# Patient Record
Sex: FEMALE | Race: WHITE | NOT HISPANIC OR LATINO | Employment: STUDENT | URBAN - METROPOLITAN AREA
[De-identification: names, ages, dates, MRNs, and addresses within clinical notes are randomized per-mention and may not be internally consistent; named-entity substitution may affect disease eponyms.]

---

## 2023-03-20 ENCOUNTER — TELEPHONE (OUTPATIENT)
Dept: PEDIATRICS | Facility: CLINIC | Age: 18
End: 2023-03-20
Payer: COMMERCIAL

## 2023-03-20 DIAGNOSIS — Z00.129 WELL ADOLESCENT VISIT: Primary | ICD-10-CM

## 2023-03-20 RX ORDER — DESOGESTREL AND ETHINYL ESTRADIOL 0.15-0.03
1 KIT ORAL DAILY
Qty: 28 TABLET | Refills: 12 | Status: SHIPPED | OUTPATIENT
Start: 2023-03-20 | End: 2023-05-11 | Stop reason: SDUPTHER

## 2023-03-20 RX ORDER — DESOGESTREL AND ETHINYL ESTRADIOL 0.15-0.03
1 KIT ORAL DAILY
COMMUNITY
End: 2023-03-20 | Stop reason: SDUPTHER

## 2023-03-20 NOTE — TELEPHONE ENCOUNTER
Needs refill on birth control pill - she has another appt. With a family doctor in May, but is out of BCP.       Done

## 2023-05-11 ENCOUNTER — OFFICE VISIT (OUTPATIENT)
Dept: PRIMARY CARE | Facility: CLINIC | Age: 18
End: 2023-05-11
Payer: COMMERCIAL

## 2023-05-11 DIAGNOSIS — Z30.41 ENCOUNTER FOR SURVEILLANCE OF CONTRACEPTIVE PILLS: ICD-10-CM

## 2023-05-11 DIAGNOSIS — Z00.129 WELL ADOLESCENT VISIT: ICD-10-CM

## 2023-05-11 DIAGNOSIS — Z11.3 SCREEN FOR SEXUALLY TRANSMITTED DISEASES: ICD-10-CM

## 2023-05-11 DIAGNOSIS — Z00.00 HEALTHCARE MAINTENANCE: Primary | ICD-10-CM

## 2023-05-11 PROBLEM — D50.9 IRON DEFICIENCY ANEMIA: Status: ACTIVE | Noted: 2023-05-11

## 2023-05-11 PROCEDURE — 1036F TOBACCO NON-USER: CPT | Performed by: FAMILY MEDICINE

## 2023-05-11 PROCEDURE — 87491 CHLMYD TRACH DNA AMP PROBE: CPT

## 2023-05-11 PROCEDURE — 87591 N.GONORRHOEAE DNA AMP PROB: CPT

## 2023-05-11 PROCEDURE — 99395 PREV VISIT EST AGE 18-39: CPT | Performed by: FAMILY MEDICINE

## 2023-05-11 RX ORDER — DESOGESTREL AND ETHINYL ESTRADIOL 0.15-0.03
1 KIT ORAL DAILY
Qty: 28 TABLET | Refills: 12 | Status: SHIPPED | OUTPATIENT
Start: 2023-05-11 | End: 2024-06-03 | Stop reason: SDUPTHER

## 2023-05-11 RX ORDER — DESOGESTREL AND ETHINYL ESTRADIOL 0.15-0.03
1 KIT ORAL DAILY
Qty: 28 TABLET | Refills: 12 | Status: SHIPPED | OUTPATIENT
Start: 2023-05-11 | End: 2023-05-11 | Stop reason: SDUPTHER

## 2023-05-11 ASSESSMENT — ENCOUNTER SYMPTOMS
HEADACHES: 0
WEAKNESS: 0
ABDOMINAL PAIN: 0
FATIGUE: 0
CONSTIPATION: 0
BLOOD IN STOOL: 0
ARTHRALGIAS: 0
EYE PAIN: 0
DIARRHEA: 0
SORE THROAT: 0
NAUSEA: 0
PALPITATIONS: 0
RHINORRHEA: 0
FREQUENCY: 0
SLEEP DISTURBANCE: 0
MYALGIAS: 0
DYSURIA: 0
DYSPHORIC MOOD: 0
SHORTNESS OF BREATH: 0
NERVOUS/ANXIOUS: 0
VOMITING: 0
BACK PAIN: 0
COUGH: 0
UNEXPECTED WEIGHT CHANGE: 0
NUMBNESS: 0
DIZZINESS: 0

## 2023-05-11 ASSESSMENT — PATIENT HEALTH QUESTIONNAIRE - PHQ9
1. LITTLE INTEREST OR PLEASURE IN DOING THINGS: NOT AT ALL
2. FEELING DOWN, DEPRESSED OR HOPELESS: NOT AT ALL
SUM OF ALL RESPONSES TO PHQ9 QUESTIONS 1 AND 2: 0

## 2023-05-11 NOTE — PROGRESS NOTES
"Subjective   Patient ID: Brittany Sandoval is a 17 y.o. female who presents for Annual Exam (And NPV).    Brittany is here for a physical.    Diet:  [employer] food  Exercise:  Running, resistance training  Sleep:  Good  Stress:  High currently but normally not bad.    Dentist:  Sees regularly  Eye doctor:  Sees regularly    Last Pap:  N/A  Mammogram:  Age 38  Colorectal cancer screening:  age 43  DEXA scan:  Age 63  Vaccines due?  May need another HPV, tetanus due next year    Sexually active:  Yes  Contraception:  OCPs          Review of Systems   Constitutional:  Negative for fatigue and unexpected weight change.   HENT:  Negative for congestion, ear pain, rhinorrhea and sore throat.    Eyes:  Negative for pain and visual disturbance.   Respiratory:  Negative for cough and shortness of breath.    Cardiovascular:  Negative for chest pain and palpitations.   Gastrointestinal:  Negative for abdominal pain, blood in stool, constipation, diarrhea, nausea and vomiting.   Genitourinary:  Negative for dysuria, frequency, menstrual problem and vaginal discharge.   Musculoskeletal:  Negative for arthralgias, back pain and myalgias.   Skin:  Negative for rash.   Neurological:  Negative for dizziness, weakness, numbness and headaches.   Psychiatric/Behavioral:  Negative for dysphoric mood and sleep disturbance. The patient is not nervous/anxious.        Objective     BP (P) 126/86   Pulse (P) 94   Temp (P) 36.9 °C (98.5 °F)   Ht (P) 1.626 m (5' 4\")   Wt (P) 72.1 kg (159 lb)   SpO2 (P) 97%   BMI (P) 27.29 kg/m²     Physical Exam  Constitutional:       General: She is not in acute distress.  HENT:      Right Ear: Tympanic membrane normal.      Left Ear: Tympanic membrane normal.   Neck:      Thyroid: No thyromegaly.   Cardiovascular:      Rate and Rhythm: Normal rate and regular rhythm.      Heart sounds: No murmur heard.  Pulmonary:      Effort: No respiratory distress.      Breath sounds: Normal breath sounds. "   Chest:   Breasts:     Breasts are symmetrical.      Right: No mass, nipple discharge, skin change or tenderness.      Left: No mass, nipple discharge, skin change or tenderness.   Abdominal:      General: Bowel sounds are normal. There is no distension.      Palpations: Abdomen is soft. There is no hepatomegaly or splenomegaly.      Tenderness: There is no abdominal tenderness.   Musculoskeletal:         General: No swelling or tenderness.   Lymphadenopathy:      Cervical: No cervical adenopathy.      Upper Body:      Right upper body: No axillary adenopathy.      Left upper body: No axillary adenopathy.   Skin:     General: Skin is warm and dry.      Coloration: Skin is not jaundiced.      Findings: No rash.   Neurological:      General: No focal deficit present.      Mental Status: She is alert and oriented to person, place, and time.   Psychiatric:         Mood and Affect: Mood normal.         Behavior: Behavior normal.             Assessment/Plan   Problem List Items Addressed This Visit    None  Visit Diagnoses       Healthcare maintenance    -  Primary    Screen for sexually transmitted diseases        Relevant Orders    C. Trachomatis / N. Gonorrhoeae, Amplified Detection    Encounter for surveillance of contraceptive pills        Well adolescent visit        Relevant Medications    Isibloom 0.15-0.03 mg tablet

## 2023-05-11 NOTE — PATIENT INSTRUCTIONS
"Thank you for coming in to see me today, and congratulations on paying attention to staying healthy!    The single most important factor in staying healthy is eating a healthy diet.  Research has shown that the healthiest diet for humans is one rich in whole fresh plant foods.  This means most of what you eat should be fresh fruits and vegetables, whole grains, beans, nuts and seeds.  Adding meat, dairy foods and eggs does not make your food healthier (although it may make it taste better!) so you should work to minimize the amount of beef, chicken, pork, turkey, lamb, cheese and eggs you eat.  Fatty fish like salmon and tuna may be healthier alternatives.  If you would like more information please check out the website \"[place] [employer],\" and the books \"The Blue Zones\" by Vishal Segura and \"Engine 2 Diet\" by Aleks Matthews.    I don't like recommending \"exercise\" because that has unpleasant connotations of pain and being out of breath for many people.  Instead, I encourage my patients to find a way to move your body that you LOVE and would want to do even if it were bad for you!  Playing a fun sport like pickleball, doing yoga or reji chi, studying martial arts, learning to dance, even chasing your kids or grandkids around the backyard are great examples of activity that makes your heart healthier.  Remember, if you don't like it, don't do it!  There are plenty of fun options, pick one and try it out!  Aim for at least 30 minutes of activity that gets your heart revved up, most days per week.    We discussed some screening tests for you today, these tests are meant to find problems before they make you sick, when they are easier to treat and less likely to impact your health.  Depending on your age and stage of life they may include blood tests, a Pap test, a mammogram, a bone density test, a colonoscopy and others.  If you have questions later about these or other recommended tests please call and ask!    There are " a number of other things you can do to improve your health.  These may include things like   Increasing the amount of water you drink every day (aim for 1 oz of water for every 2 pounds of body weight, up to about 100 oz total)  Getting 7-8 oz of sleep every night  Avoiding smoking, drinking alcohol, and using recreational drugs    Stress management is also a very important component of staying healthy.  One of the most effective stress management tools is meditation.  I recommend everyone consider proper training in meditation - it isn't just sitting with your eyes closed and breathing.  You can find a training program in your area at [employer] or [email or web address].    An annual physical is a great measure to keep you as healthy as you possibly can be.  Good for you for getting that done!  See you next year :-)

## 2023-05-13 LAB
CHLAMYDIA TRACH., AMPLIFIED: NEGATIVE
N. GONORRHEA, AMPLIFIED: NEGATIVE

## 2024-05-13 ENCOUNTER — TELEPHONE (OUTPATIENT)
Dept: PRIMARY CARE | Facility: CLINIC | Age: 19
End: 2024-05-13
Payer: COMMERCIAL

## 2024-06-03 DIAGNOSIS — Z00.129 WELL ADOLESCENT VISIT: ICD-10-CM

## 2024-06-03 RX ORDER — DESOGESTREL AND ETHINYL ESTRADIOL 0.15-0.03
1 KIT ORAL DAILY
Qty: 28 TABLET | Refills: 0 | Status: SHIPPED | OUTPATIENT
Start: 2024-06-03

## 2024-06-23 ASSESSMENT — PROMIS GLOBAL HEALTH SCALE
RATE_SOCIAL_SATISFACTION: VERY GOOD
CARRYOUT_PHYSICAL_ACTIVITIES: COMPLETELY
RATE_PHYSICAL_HEALTH: GOOD
CARRYOUT_SOCIAL_ACTIVITIES: VERY GOOD
EMOTIONAL_PROBLEMS: NEVER
RATE_GENERAL_HEALTH: VERY GOOD
RATE_QUALITY_OF_LIFE: VERY GOOD
RATE_AVERAGE_PAIN: 0
RATE_MENTAL_HEALTH: VERY GOOD

## 2024-06-25 ENCOUNTER — LAB (OUTPATIENT)
Dept: LAB | Facility: LAB | Age: 19
End: 2024-06-25
Payer: COMMERCIAL

## 2024-06-25 ENCOUNTER — APPOINTMENT (OUTPATIENT)
Dept: PRIMARY CARE | Facility: CLINIC | Age: 19
End: 2024-06-25
Payer: COMMERCIAL

## 2024-06-25 VITALS
WEIGHT: 169.8 LBS | OXYGEN SATURATION: 97 % | HEIGHT: 64 IN | HEART RATE: 84 BPM | DIASTOLIC BLOOD PRESSURE: 96 MMHG | BODY MASS INDEX: 28.99 KG/M2 | TEMPERATURE: 95.6 F | SYSTOLIC BLOOD PRESSURE: 133 MMHG

## 2024-06-25 DIAGNOSIS — Z00.00 HEALTHCARE MAINTENANCE: ICD-10-CM

## 2024-06-25 DIAGNOSIS — Z12.4 SCREENING FOR CERVICAL CANCER: ICD-10-CM

## 2024-06-25 DIAGNOSIS — D50.9 IRON DEFICIENCY ANEMIA, UNSPECIFIED IRON DEFICIENCY ANEMIA TYPE: ICD-10-CM

## 2024-06-25 DIAGNOSIS — Z00.129 WELL ADOLESCENT VISIT: ICD-10-CM

## 2024-06-25 DIAGNOSIS — Z00.00 HEALTHCARE MAINTENANCE: Primary | ICD-10-CM

## 2024-06-25 DIAGNOSIS — Z11.59 NEED FOR HEPATITIS C SCREENING TEST: ICD-10-CM

## 2024-06-25 DIAGNOSIS — Z23 NEED FOR VACCINATION: ICD-10-CM

## 2024-06-25 LAB
ALBUMIN SERPL BCP-MCNC: 4.3 G/DL (ref 3.4–5)
ALP SERPL-CCNC: 72 U/L (ref 33–110)
ALT SERPL W P-5'-P-CCNC: 10 U/L (ref 7–45)
ANION GAP SERPL CALC-SCNC: 14 MMOL/L (ref 10–20)
AST SERPL W P-5'-P-CCNC: 19 U/L (ref 9–39)
BASOPHILS # BLD AUTO: 0.03 X10*3/UL (ref 0–0.1)
BASOPHILS NFR BLD AUTO: 0.5 %
BILIRUB SERPL-MCNC: 0.4 MG/DL (ref 0–1.2)
BUN SERPL-MCNC: 7 MG/DL (ref 6–23)
CALCIUM SERPL-MCNC: 9.6 MG/DL (ref 8.6–10.6)
CHLORIDE SERPL-SCNC: 103 MMOL/L (ref 98–107)
CO2 SERPL-SCNC: 26 MMOL/L (ref 21–32)
CREAT SERPL-MCNC: 0.6 MG/DL (ref 0.5–1.05)
EGFRCR SERPLBLD CKD-EPI 2021: >90 ML/MIN/1.73M*2
EOSINOPHIL # BLD AUTO: 0.1 X10*3/UL (ref 0–0.7)
EOSINOPHIL NFR BLD AUTO: 1.6 %
ERYTHROCYTE [DISTWIDTH] IN BLOOD BY AUTOMATED COUNT: 12.3 % (ref 11.5–14.5)
GLUCOSE SERPL-MCNC: 96 MG/DL (ref 74–99)
HCT VFR BLD AUTO: 39.8 % (ref 36–46)
HCV AB SER QL: NONREACTIVE
HGB BLD-MCNC: 13.3 G/DL (ref 12–16)
IMM GRANULOCYTES # BLD AUTO: 0.01 X10*3/UL (ref 0–0.7)
IMM GRANULOCYTES NFR BLD AUTO: 0.2 % (ref 0–0.9)
IRON SATN MFR SERPL: 22 % (ref 25–45)
IRON SERPL-MCNC: 97 UG/DL (ref 35–150)
LYMPHOCYTES # BLD AUTO: 2.48 X10*3/UL (ref 1.2–4.8)
LYMPHOCYTES NFR BLD AUTO: 40.1 %
MCH RBC QN AUTO: 30.3 PG (ref 26–34)
MCHC RBC AUTO-ENTMCNC: 33.4 G/DL (ref 32–36)
MCV RBC AUTO: 91 FL (ref 80–100)
MONOCYTES # BLD AUTO: 0.6 X10*3/UL (ref 0.1–1)
MONOCYTES NFR BLD AUTO: 9.7 %
NEUTROPHILS # BLD AUTO: 2.96 X10*3/UL (ref 1.2–7.7)
NEUTROPHILS NFR BLD AUTO: 47.9 %
NRBC BLD-RTO: 0 /100 WBCS (ref 0–0)
PLATELET # BLD AUTO: 258 X10*3/UL (ref 150–450)
POTASSIUM SERPL-SCNC: 4.4 MMOL/L (ref 3.5–5.3)
PROT SERPL-MCNC: 7.3 G/DL (ref 6.4–8.2)
RBC # BLD AUTO: 4.39 X10*6/UL (ref 4–5.2)
SODIUM SERPL-SCNC: 139 MMOL/L (ref 136–145)
TIBC SERPL-MCNC: 445 UG/DL (ref 240–445)
UIBC SERPL-MCNC: 348 UG/DL (ref 110–370)
WBC # BLD AUTO: 6.2 X10*3/UL (ref 4.4–11.3)

## 2024-06-25 PROCEDURE — 87491 CHLMYD TRACH DNA AMP PROBE: CPT

## 2024-06-25 PROCEDURE — 87591 N.GONORRHOEAE DNA AMP PROB: CPT

## 2024-06-25 PROCEDURE — 90715 TDAP VACCINE 7 YRS/> IM: CPT | Performed by: FAMILY MEDICINE

## 2024-06-25 PROCEDURE — 99395 PREV VISIT EST AGE 18-39: CPT | Performed by: FAMILY MEDICINE

## 2024-06-25 PROCEDURE — 1036F TOBACCO NON-USER: CPT | Performed by: FAMILY MEDICINE

## 2024-06-25 PROCEDURE — 83540 ASSAY OF IRON: CPT

## 2024-06-25 PROCEDURE — 36415 COLL VENOUS BLD VENIPUNCTURE: CPT

## 2024-06-25 PROCEDURE — 80053 COMPREHEN METABOLIC PANEL: CPT

## 2024-06-25 PROCEDURE — 85025 COMPLETE CBC W/AUTO DIFF WBC: CPT

## 2024-06-25 PROCEDURE — 90471 IMMUNIZATION ADMIN: CPT | Performed by: FAMILY MEDICINE

## 2024-06-25 PROCEDURE — 86803 HEPATITIS C AB TEST: CPT

## 2024-06-25 PROCEDURE — 83550 IRON BINDING TEST: CPT

## 2024-06-25 RX ORDER — MULTIVIT-MIN/IRON FUM/FOLIC AC 7.5 MG-4
1 TABLET ORAL DAILY
COMMUNITY

## 2024-06-25 RX ORDER — DESOGESTREL AND ETHINYL ESTRADIOL 0.15-0.03
1 KIT ORAL DAILY
Qty: 360 TABLET | Refills: 0 | Status: SHIPPED | OUTPATIENT
Start: 2024-06-25 | End: 2025-06-25

## 2024-06-25 ASSESSMENT — PATIENT HEALTH QUESTIONNAIRE - PHQ9
1. LITTLE INTEREST OR PLEASURE IN DOING THINGS: NOT AT ALL
SUM OF ALL RESPONSES TO PHQ9 QUESTIONS 1 AND 2: 0
2. FEELING DOWN, DEPRESSED OR HOPELESS: NOT AT ALL

## 2024-06-25 ASSESSMENT — ENCOUNTER SYMPTOMS
MYALGIAS: 0
DYSURIA: 0
WEAKNESS: 0
SORE THROAT: 0
BACK PAIN: 0
DIARRHEA: 0
DEPRESSION: 0
FREQUENCY: 0
NERVOUS/ANXIOUS: 0
SLEEP DISTURBANCE: 0
BLOOD IN STOOL: 0
DIZZINESS: 0
SHORTNESS OF BREATH: 0
RHINORRHEA: 0
CONSTIPATION: 0
NUMBNESS: 0
COUGH: 0
ARTHRALGIAS: 0
UNEXPECTED WEIGHT CHANGE: 1
EYE PAIN: 0
PALPITATIONS: 0
NAUSEA: 0
FATIGUE: 0
DYSPHORIC MOOD: 0
HEADACHES: 0
ABDOMINAL PAIN: 0
VOMITING: 0

## 2024-06-25 ASSESSMENT — COLUMBIA-SUICIDE SEVERITY RATING SCALE - C-SSRS
1. IN THE PAST MONTH, HAVE YOU WISHED YOU WERE DEAD OR WISHED YOU COULD GO TO SLEEP AND NOT WAKE UP?: NO
2. HAVE YOU ACTUALLY HAD ANY THOUGHTS OF KILLING YOURSELF?: NO
6. HAVE YOU EVER DONE ANYTHING, STARTED TO DO ANYTHING, OR PREPARED TO DO ANYTHING TO END YOUR LIFE?: NO

## 2024-06-25 NOTE — PROGRESS NOTES
"Subjective   Patient ID: Brittany Sandoval is a 19 y.o. female who presents for Annual Exam and Gynecologic Exam.    Brittany is here for a physical.    Diet:  [employer] food  Exercise:  Running, resistance training  Sleep:  Good  Stress:  Low  Smoking:  Never  EtOH:  Occasional    Profession:  College student, rising senior, early childhood education    Dentist:  Sees regularly  Eye doctor:  Sees regularly    Last Pap:  N/A  Mammogram:  Age 38  Colorectal cancer screening:  age 43  DEXA scan:  Age 63  Vaccines due?  Needs TDaP    Sexually active:  Yes  Contraception:  OCPs      Gynecologic Exam  The patient's pertinent negatives include no vaginal discharge. Pertinent negatives include no abdominal pain, back pain, constipation, diarrhea, dysuria, frequency, headaches, nausea, rash, sore throat or vomiting.       Review of Systems   Constitutional:  Positive for unexpected weight change (has gained 10 pounds in the last year). Negative for fatigue.   HENT:  Negative for congestion, ear pain, rhinorrhea and sore throat.    Eyes:  Negative for pain and visual disturbance.   Respiratory:  Negative for cough and shortness of breath.    Cardiovascular:  Negative for chest pain and palpitations.   Gastrointestinal:  Negative for abdominal pain, blood in stool, constipation, diarrhea, nausea and vomiting.   Genitourinary:  Negative for dysuria, frequency, menstrual problem and vaginal discharge.   Musculoskeletal:  Negative for arthralgias, back pain and myalgias.   Skin:  Negative for rash.   Neurological:  Negative for dizziness, weakness, numbness and headaches.   Psychiatric/Behavioral:  Negative for dysphoric mood and sleep disturbance. The patient is not nervous/anxious.        Objective     BP (!) 133/96   Pulse 84   Temp 35.3 °C (95.6 °F)   Ht 1.626 m (5' 4\")   Wt 77 kg (169 lb 12.8 oz)   SpO2 97%   BMI 29.15 kg/m²     Physical Exam  Constitutional:       General: She is not in acute distress.  HENT:    "   Right Ear: Tympanic membrane normal.      Left Ear: Tympanic membrane normal.   Neck:      Thyroid: No thyromegaly.   Cardiovascular:      Rate and Rhythm: Normal rate and regular rhythm.      Heart sounds: No murmur heard.  Pulmonary:      Effort: No respiratory distress.      Breath sounds: Normal breath sounds.   Chest:   Breasts:     Breasts are symmetrical.      Right: No mass, nipple discharge, skin change or tenderness.      Left: No mass, nipple discharge, skin change or tenderness.   Abdominal:      General: Bowel sounds are normal. There is no distension.      Palpations: Abdomen is soft. There is no hepatomegaly or splenomegaly.      Tenderness: There is no abdominal tenderness.   Genitourinary:     General: Normal vulva.      Vagina: Normal.      Cervix: Normal.   Musculoskeletal:         General: No swelling or tenderness.   Lymphadenopathy:      Cervical: No cervical adenopathy.      Upper Body:      Right upper body: No axillary adenopathy.      Left upper body: No axillary adenopathy.   Skin:     General: Skin is warm and dry.      Coloration: Skin is not jaundiced.      Findings: No rash.   Neurological:      General: No focal deficit present.      Mental Status: She is alert and oriented to person, place, and time.   Psychiatric:         Mood and Affect: Mood normal.         Behavior: Behavior normal.             Assessment/Plan   Problem List Items Addressed This Visit       Iron deficiency anemia    Relevant Orders    Iron and TIBC     Other Visit Diagnoses       Healthcare maintenance    -  Primary    Relevant Orders    Comprehensive Metabolic Panel    CBC and Auto Differential    Need for hepatitis C screening test        Relevant Orders    Hepatitis C Antibody    Need for vaccination        Relevant Orders    Tdap vaccine, age 7 years and older (Completed)    Screening for cervical cancer        Relevant Orders    THINPREP PAP TEST    Well adolescent visit        Relevant Medications     Isibloom 0.15-0.03 mg tablet

## 2024-06-25 NOTE — PATIENT INSTRUCTIONS
"Thank you for coming in to see me today, and congratulations on paying attention to staying healthy!    The single most important factor in staying healthy is eating a healthy diet.  Research has shown that the healthiest diet for humans is one rich in whole fresh plant foods.  This means most of what you eat should be fresh fruits and vegetables, whole grains, beans, nuts and seeds.  Adding meat, dairy foods and eggs does not make your food healthier (although it may make it taste better!) so you should work to minimize the amount of beef, chicken, pork, turkey, lamb, cheese and eggs you eat.  Fatty fish like salmon and tuna may be healthier alternatives.  If you would like more information please check out the website \"[place] [employer],\" and the books \"The Blue Zones\" by Vishal Segura and \"Engine 2 Diet\" by Aleks Matthews.    I don't like recommending \"exercise\" because that has unpleasant connotations of pain and being out of breath for many people.  Instead, I encourage my patients to find a way to move your body that you LOVE and would want to do even if it were bad for you!  Playing a fun sport like pickleball, doing yoga or reji chi, studying martial arts, learning to dance, even chasing your kids or grandkids around the backyard are great examples of activity that makes your heart healthier.  Remember, if you don't like it, don't do it!  There are plenty of fun options, pick one and try it out!  Aim for at least 30 minutes of activity that gets your heart revved up, most days per week.    We discussed some screening tests for you today, these tests are meant to find problems before they make you sick, when they are easier to treat and less likely to impact your health.  Depending on your age and stage of life they may include blood tests, a Pap test, a mammogram, a bone density test, a colonoscopy and others.  I can also order a test called Galleri, which is a blood test to screen for cancer.  It is not yet " covered by insurance and costs about $800, but I'm happy to order it if you would like.  If you have questions later about these or other recommended tests please call and ask!    There are a number of other things you can do to improve your health.  These may include things like   Increasing the amount of water you drink every day (aim for 1 oz of water for every 2 pounds of body weight, up to about 100 oz total)  Getting 7-8 hours of sleep every night  Avoiding smoking, drinking alcohol, and using recreational drugs    Stress management is also a very important component of staying healthy.  One of the most effective stress management tools is meditation.  I recommend everyone consider proper training in meditation - it isn't just sitting with your eyes closed and breathing.  You can find a training program in your area at [email or web address] or [email or web address].    An annual physical is a great measure to keep you as healthy as you possibly can be.  Good for you for getting that done!  See you next year :-)

## 2024-06-26 LAB
C TRACH RRNA SPEC QL NAA+PROBE: NEGATIVE
N GONORRHOEA DNA SPEC QL PROBE+SIG AMP: NEGATIVE

## 2024-07-16 LAB
CYTOLOGY CMNT CVX/VAG CYTO-IMP: NORMAL
HPV HR 12 DNA GENITAL QL NAA+PROBE: NEGATIVE
HPV HR GENOTYPES PNL CVX NAA+PROBE: NEGATIVE
HPV16 DNA SPEC QL NAA+PROBE: NEGATIVE
HPV18 DNA SPEC QL NAA+PROBE: NEGATIVE
LAB AP HPV GENOTYPE QUESTION: YES
LAB AP HPV HR: NORMAL
LAB AP PAP ADDITIONAL TESTS: NORMAL
LABORATORY COMMENT REPORT: NORMAL
PATH REPORT.TOTAL CANCER: NORMAL

## 2025-03-26 ENCOUNTER — APPOINTMENT (OUTPATIENT)
Dept: PRIMARY CARE | Facility: CLINIC | Age: 20
End: 2025-03-26
Payer: COMMERCIAL

## 2025-03-26 VITALS
BODY MASS INDEX: 29.53 KG/M2 | SYSTOLIC BLOOD PRESSURE: 126 MMHG | TEMPERATURE: 97.5 F | HEART RATE: 80 BPM | WEIGHT: 173 LBS | DIASTOLIC BLOOD PRESSURE: 86 MMHG | HEIGHT: 64 IN | OXYGEN SATURATION: 98 %

## 2025-03-26 DIAGNOSIS — Z78.9 USES BIRTH CONTROL: ICD-10-CM

## 2025-03-26 DIAGNOSIS — Z00.00 ANNUAL PHYSICAL EXAM: Primary | ICD-10-CM

## 2025-03-26 PROBLEM — R71.8 MEAN RED BLOOD CELL VOLUME INCREASED: Status: RESOLVED | Noted: 2025-03-26 | Resolved: 2025-03-26

## 2025-03-26 PROBLEM — S89.91XA INJURY OF RIGHT LOWER EXTREMITY: Status: RESOLVED | Noted: 2025-03-26 | Resolved: 2025-03-26

## 2025-03-26 PROBLEM — S93.409A SPRAIN OF ANKLE: Status: RESOLVED | Noted: 2025-03-26 | Resolved: 2025-03-26

## 2025-03-26 PROBLEM — R01.2: Status: ACTIVE | Noted: 2025-03-26

## 2025-03-26 PROCEDURE — 3008F BODY MASS INDEX DOCD: CPT | Performed by: FAMILY MEDICINE

## 2025-03-26 PROCEDURE — 99395 PREV VISIT EST AGE 18-39: CPT | Performed by: FAMILY MEDICINE

## 2025-03-26 RX ORDER — NORGESTIMATE AND ETHINYL ESTRADIOL 7DAYSX3 28
1 KIT ORAL DAILY
Qty: 84 TABLET | Refills: 3 | Status: SHIPPED | OUTPATIENT
Start: 2025-03-26 | End: 2026-03-26

## 2025-03-26 ASSESSMENT — ENCOUNTER SYMPTOMS
OCCASIONAL FEELINGS OF UNSTEADINESS: 0
LOSS OF SENSATION IN FEET: 0
DEPRESSION: 0

## 2025-03-26 NOTE — PROGRESS NOTES
"Subjective   Patient ID: Brittany Sandoval is a 19 y.o. female who presents for Annual Exam (Pt is here for annual exam and medication refill ).  Annual physical exam  Abnormal weight gain  Works as a   Graduated from [employer] in [employer]  On birth control no side effects or issues        Review of Systems  Constitutional: no chills, no fever and no night sweats.   Eyes: no blurred vision and no eyesight problems.   ENT: no hearing loss, no nasal congestion, no nasal discharge, no hoarseness and no sore throat.   Cardiovascular: no chest pain, no intermittent leg claudication, no lower extremity edema, no palpitations and no syncope.   Respiratory: no cough, no shortness of breath during exertion, no shortness of breath at rest and no wheezing.   Gastrointestinal: no abdominal pain, no blood in stools, no constipation, no diarrhea, no melena, no nausea, no rectal pain and no vomiting.   Genitourinary: no dysuria, no change in urinary frequency, no urinary hesitancy, no feelings of urinary urgency and no vaginal discharge.   Musculoskeletal: no arthralgias,  no back pain and no myalgias.   Integumentary: no new skin lesions and no rashes.   Neurological: no difficulty walking, no headache, no limb weakness, no numbness and no tingling.   Psychiatric: no anxiety, no depression, no anhedonia and no substance use disorders.   Endocrine: no recent weight gain and no recent weight loss.   Hematologic/Lymphatic: no tendency for easy bruising and no swollen glands .    Objective    /86   Pulse 80   Temp 36.4 °C (97.5 °F)   Ht 1.626 m (5' 4\")   Wt 78.5 kg (173 lb)   SpO2 98%   BMI 29.70 kg/m²    Physical Exam  The patient appeared well nourished and normally developed. Vital signs as documented. Head exam is unremarkable. No scleral icterus or corneal arcus noted.  Pupils are equal round reactive to light extraocular movements are intact no hemorrhages noted on funduscopic exam mouth mucous membranes are " moist no exudates ears canals clear TMs are gray pearly not injected nose no rhinorrhea or epistaxis Neck is without jugular venous distension, thyromegaly, or carotid bruits. Carotid upstrokes are brisk bilaterally. Lungs are clear to auscultation and percussion. Cardiac exam reveals the PMI to be normally sized and situated. Rhythm is regular. First and second heart sounds normal. No murmurs, rubs or gallops. Abdominal exam reveals normal bowel sounds, no masses, no organomegaly and no aortic enlargement. Extremities are nonedematous and both femoral and pedal pulses are normal.  Neurologic exam DTRs are equal bilaterally no focal deficits strength is symmetrical heme lymph no palpable lymph nodes in the neck axilla or groin    Assessment/Plan   Problem List Items Addressed This Visit       Annual physical exam - Primary    Relevant Orders    Comprehensive metabolic panel (Completed)    Lipid panel (Completed)    Hemoglobin A1C (Completed)    CBC and Auto Differential (Completed)    Iron and TIBC (Completed)    TSH (Completed)     Other Visit Diagnoses       Uses birth control        Relevant Medications    norgestimate-ethinyl estradioL (Tri-Sprintec, 28,) 0.18/0.215/0.25 mg-35 mcg (28) tablet                 Molly Vaca MD

## 2025-03-28 LAB
ALBUMIN SERPL-MCNC: 4.5 G/DL (ref 3.6–5.1)
ALP SERPL-CCNC: 69 U/L (ref 31–125)
ALT SERPL-CCNC: 13 U/L (ref 6–29)
ANION GAP SERPL CALCULATED.4IONS-SCNC: 9 MMOL/L (CALC) (ref 7–17)
AST SERPL-CCNC: 18 U/L (ref 10–30)
BASOPHILS # BLD AUTO: 39 CELLS/UL (ref 0–200)
BASOPHILS NFR BLD AUTO: 0.6 %
BILIRUB SERPL-MCNC: 0.5 MG/DL (ref 0.2–1.2)
BUN SERPL-MCNC: 8 MG/DL (ref 7–25)
CALCIUM SERPL-MCNC: 9.8 MG/DL (ref 8.6–10.2)
CHLORIDE SERPL-SCNC: 104 MMOL/L (ref 98–110)
CHOLEST SERPL-MCNC: 184 MG/DL
CHOLEST/HDLC SERPL: 2.4 (CALC)
CO2 SERPL-SCNC: 25 MMOL/L (ref 20–32)
CREAT SERPL-MCNC: 0.56 MG/DL (ref 0.5–0.96)
EGFRCR SERPLBLD CKD-EPI 2021: 133 ML/MIN/1.73M2
EOSINOPHIL # BLD AUTO: 143 CELLS/UL (ref 15–500)
EOSINOPHIL NFR BLD AUTO: 2.2 %
ERYTHROCYTE [DISTWIDTH] IN BLOOD BY AUTOMATED COUNT: 12.2 % (ref 11–15)
EST. AVERAGE GLUCOSE BLD GHB EST-MCNC: 103 MG/DL
EST. AVERAGE GLUCOSE BLD GHB EST-SCNC: 5.7 MMOL/L
GLUCOSE SERPL-MCNC: 92 MG/DL (ref 65–99)
HBA1C MFR BLD: 5.2 % OF TOTAL HGB
HCT VFR BLD AUTO: 40.6 % (ref 35–45)
HDLC SERPL-MCNC: 76 MG/DL
HGB BLD-MCNC: 13.7 G/DL (ref 11.7–15.5)
IRON SATN MFR SERPL: 31 % (CALC) (ref 16–45)
IRON SERPL-MCNC: 133 MCG/DL (ref 40–190)
LDLC SERPL CALC-MCNC: 93 MG/DL (CALC)
LYMPHOCYTES # BLD AUTO: 2945 CELLS/UL (ref 850–3900)
LYMPHOCYTES NFR BLD AUTO: 45.3 %
MCH RBC QN AUTO: 30.9 PG (ref 27–33)
MCHC RBC AUTO-ENTMCNC: 33.7 G/DL (ref 32–36)
MCV RBC AUTO: 91.6 FL (ref 80–100)
MONOCYTES # BLD AUTO: 559 CELLS/UL (ref 200–950)
MONOCYTES NFR BLD AUTO: 8.6 %
NEUTROPHILS # BLD AUTO: 2815 CELLS/UL (ref 1500–7800)
NEUTROPHILS NFR BLD AUTO: 43.3 %
NONHDLC SERPL-MCNC: 108 MG/DL (CALC)
PLATELET # BLD AUTO: 254 THOUSAND/UL (ref 140–400)
PMV BLD REES-ECKER: 10.6 FL (ref 7.5–12.5)
POTASSIUM SERPL-SCNC: 4.5 MMOL/L (ref 3.5–5.3)
PROT SERPL-MCNC: 7.3 G/DL (ref 6.1–8.1)
RBC # BLD AUTO: 4.43 MILLION/UL (ref 3.8–5.1)
SODIUM SERPL-SCNC: 138 MMOL/L (ref 135–146)
TIBC SERPL-MCNC: 435 MCG/DL (CALC) (ref 250–450)
TRIGL SERPL-MCNC: 66 MG/DL
TSH SERPL-ACNC: 1.29 MIU/L
WBC # BLD AUTO: 6.5 THOUSAND/UL (ref 3.8–10.8)